# Patient Record
Sex: FEMALE | NOT HISPANIC OR LATINO | URBAN - METROPOLITAN AREA
[De-identification: names, ages, dates, MRNs, and addresses within clinical notes are randomized per-mention and may not be internally consistent; named-entity substitution may affect disease eponyms.]

---

## 2021-11-05 ENCOUNTER — OFFICE VISIT (OUTPATIENT)
Dept: URBAN - METROPOLITAN AREA CLINIC 72 | Facility: CLINIC | Age: 81
End: 2021-11-05
Payer: COMMERCIAL

## 2021-11-05 ENCOUNTER — WEB ENCOUNTER (OUTPATIENT)
Dept: URBAN - METROPOLITAN AREA CLINIC 72 | Facility: CLINIC | Age: 81
End: 2021-11-05

## 2021-11-05 VITALS
SYSTOLIC BLOOD PRESSURE: 157 MMHG | HEART RATE: 80 BPM | TEMPERATURE: 98.7 F | HEIGHT: 61 IN | WEIGHT: 144.2 LBS | DIASTOLIC BLOOD PRESSURE: 81 MMHG | RESPIRATION RATE: 18 BRPM | BODY MASS INDEX: 27.23 KG/M2

## 2021-11-05 DIAGNOSIS — R19.4 CHANGE IN BOWEL HABIT: ICD-10-CM

## 2021-11-05 PROCEDURE — 99203 OFFICE O/P NEW LOW 30 MIN: CPT | Performed by: INTERNAL MEDICINE

## 2021-11-05 RX ORDER — TETRAHYDROZOLINE HCL 0.05 G/100ML
1 DROP INTO AFFECTED EYE  AS NEEDED SOLUTION/ DROPS OPHTHALMIC
Status: ACTIVE | COMMUNITY

## 2021-11-05 RX ORDER — LEVOTHYROXINE SODIUM 0.07 MG/1
1 TABLET IN THE MORNING ON AN EMPTY STOMACH TABLET ORAL ONCE A DAY
Status: ACTIVE | COMMUNITY

## 2021-11-05 RX ORDER — VALSARTAN 160 MG/1
1 TABLET TABLET, FILM COATED ORAL ONCE A DAY
Status: ACTIVE | COMMUNITY

## 2021-11-05 NOTE — HPI-TODAY'S VISIT:
Mrs. Lamb is an 81-year-old female who presents as a new consultation for evaluation for frequent pencil thin stools.  She was referred by Dr. Manny Barrera.  She did have negative Cologuard testing performed 4/2021.  Her past medical history is significant for hypertension hypothyroidism, Takotsubo cardiomyopathy, hyperlipidemia.  She reports that she has been faithful with colonoscopies, her last, but it 75, had polyps at age 50 but no further on her subsequent exams.  She reports that recently she had a change in medications and she is now seeing a little bit more volume to her stool.  She was initially having diarrhea postprandially.  She believes the symptoms have been happening for 4 or more months, they are what prompted the Cologuard testing.  She does have a family history of colorectal cancer in her siblings.  She denies any weight loss, denies any blood in her stool, denies any abdominal pain.  She admittedly says she does not get much fiber in her diet.  She is not interested in overly invasive testing.

## 2021-12-01 ENCOUNTER — TELEPHONE ENCOUNTER (OUTPATIENT)
Dept: URBAN - METROPOLITAN AREA CLINIC 72 | Facility: CLINIC | Age: 81
End: 2021-12-01

## 2021-12-10 ENCOUNTER — OFFICE VISIT (OUTPATIENT)
Dept: URBAN - METROPOLITAN AREA CLINIC 72 | Facility: CLINIC | Age: 81
End: 2021-12-10

## 2021-12-29 ENCOUNTER — TELEPHONE ENCOUNTER (OUTPATIENT)
Dept: URBAN - METROPOLITAN AREA CLINIC 72 | Facility: CLINIC | Age: 81
End: 2021-12-29

## 2022-01-13 ENCOUNTER — OFFICE VISIT (OUTPATIENT)
Dept: URBAN - METROPOLITAN AREA CLINIC 72 | Facility: CLINIC | Age: 82
End: 2022-01-13
Payer: COMMERCIAL

## 2022-01-13 ENCOUNTER — DASHBOARD ENCOUNTERS (OUTPATIENT)
Age: 82
End: 2022-01-13

## 2022-01-13 VITALS
WEIGHT: 145 LBS | HEIGHT: 61 IN | RESPIRATION RATE: 18 BRPM | SYSTOLIC BLOOD PRESSURE: 100 MMHG | DIASTOLIC BLOOD PRESSURE: 56 MMHG | BODY MASS INDEX: 27.38 KG/M2 | HEART RATE: 64 BPM | TEMPERATURE: 98.1 F

## 2022-01-13 DIAGNOSIS — R19.4 CHANGE IN BOWEL HABIT: ICD-10-CM

## 2022-01-13 PROCEDURE — 99213 OFFICE O/P EST LOW 20 MIN: CPT | Performed by: INTERNAL MEDICINE

## 2022-01-13 RX ORDER — TETRAHYDROZOLINE HCL 0.05 G/100ML
1 DROP INTO AFFECTED EYE  AS NEEDED SOLUTION/ DROPS OPHTHALMIC
Status: ACTIVE | COMMUNITY

## 2022-01-13 RX ORDER — VALSARTAN 160 MG/1
1 TABLET TABLET, FILM COATED ORAL ONCE A DAY
Status: ACTIVE | COMMUNITY

## 2022-01-13 RX ORDER — LEVOTHYROXINE SODIUM 0.07 MG/1
1 TABLET IN THE MORNING ON AN EMPTY STOMACH TABLET ORAL ONCE A DAY
Status: ACTIVE | COMMUNITY

## 2022-01-13 NOTE — HPI-TODAY'S VISIT:
Mrs. Lamb returns for follow-up.  She is an 81-year-old female last seen in our office on 11/5/2021.  She was a new consult at that time for pencil thin stools.  She reported a negative Cologuard test in April 2021.  Her notable medical history included hypertension, hypothyroidism, Takotsubo cardiomyopathy and hyperlipidemia.  She reported polyps at age 50 but no further polyps on all subsequent colonoscopies.  She reports that her family history is significant for colorectal cancer in her siblings.  We discussed at last office visit that the Cologuard testing was not recommended as she is considered a high risk individual given her family history.  Regardless she did not want to undergo any invasive testing, we discussed consideration for CT scan but she also declined.  She wanted to try conservative therapy with increasing her dietary fiber and if any alarm symptoms occurred she would notify us.  Initially her symptoms did well with the fiber we advised however she developed constipation.  She did endorse not drinking a lot of water and folic this may be contributing to it.  She has been using Dulcolax intermittently.

## 2022-02-22 ENCOUNTER — OFFICE VISIT (OUTPATIENT)
Dept: URBAN - METROPOLITAN AREA CLINIC 72 | Facility: CLINIC | Age: 82
End: 2022-02-22

## 2022-03-30 ENCOUNTER — OFFICE VISIT (OUTPATIENT)
Dept: URBAN - METROPOLITAN AREA CLINIC 72 | Facility: CLINIC | Age: 82
End: 2022-03-30